# Patient Record
Sex: FEMALE | Race: WHITE | NOT HISPANIC OR LATINO | URBAN - NONMETROPOLITAN AREA
[De-identification: names, ages, dates, MRNs, and addresses within clinical notes are randomized per-mention and may not be internally consistent; named-entity substitution may affect disease eponyms.]

---

## 2017-01-25 ENCOUNTER — APPOINTMENT (RX ONLY)
Dept: URBAN - NONMETROPOLITAN AREA CLINIC 28 | Facility: CLINIC | Age: 22
Setting detail: DERMATOLOGY
End: 2017-01-25

## 2017-01-25 VITALS
DIASTOLIC BLOOD PRESSURE: 69 MMHG | HEART RATE: 67 BPM | SYSTOLIC BLOOD PRESSURE: 103 MMHG | HEIGHT: 65 IN | WEIGHT: 115 LBS

## 2017-01-25 DIAGNOSIS — L70.0 ACNE VULGARIS: ICD-10-CM

## 2017-01-25 DIAGNOSIS — L90.5 SCAR CONDITIONS AND FIBROSIS OF SKIN: ICD-10-CM

## 2017-01-25 DIAGNOSIS — Z79.899 OTHER LONG TERM (CURRENT) DRUG THERAPY: ICD-10-CM

## 2017-01-25 PROCEDURE — 81025 URINE PREGNANCY TEST: CPT

## 2017-01-25 PROCEDURE — ? PRESCRIPTION

## 2017-01-25 PROCEDURE — ? URINE PREGNANCY TEST

## 2017-01-25 PROCEDURE — ? ISOTRETINOIN INITIATION

## 2017-01-25 PROCEDURE — ? ORDER TESTS

## 2017-01-25 PROCEDURE — 99203 OFFICE O/P NEW LOW 30 MIN: CPT

## 2017-01-25 PROCEDURE — ? COUNSELING

## 2017-01-25 RX ORDER — DOXYCYCLINE HYCLATE 100 MG/1
CAPSULE, GELATIN COATED ORAL BID
Qty: 60 | Refills: 1 | Status: ERX | COMMUNITY
Start: 2017-01-25

## 2017-01-25 RX ADMIN — DOXYCYCLINE HYCLATE: 100 CAPSULE, GELATIN COATED ORAL at 20:06

## 2017-01-25 ASSESSMENT — LOCATION SIMPLE DESCRIPTION DERM
LOCATION SIMPLE: UPPER BACK
LOCATION SIMPLE: LEFT CHEEK

## 2017-01-25 ASSESSMENT — LOCATION ZONE DERM
LOCATION ZONE: TRUNK
LOCATION ZONE: FACE

## 2017-01-25 ASSESSMENT — LOCATION DETAILED DESCRIPTION DERM
LOCATION DETAILED: SUPERIOR THORACIC SPINE
LOCATION DETAILED: LEFT INFERIOR MEDIAL MALAR CHEEK
LOCATION DETAILED: LEFT INFERIOR CENTRAL MALAR CHEEK

## 2017-01-25 NOTE — PROCEDURE: URINE PREGNANCY TEST
Urine Pregnancy Test Result: negative
Detail Level: None
Expiration Date (Optional): 2018/04
Lot # (Optional): [id number]

## 2017-01-25 NOTE — HPI: PIMPLES (NODULAR ACNE)
How Severe Is Your Nodular Acne?: severe
Is This A New Presentation, Or A Follow-Up?: Acne
Females Only: When Was Your Last Menstrual Period?: 01/05/2017

## 2017-03-03 ENCOUNTER — APPOINTMENT (RX ONLY)
Dept: URBAN - NONMETROPOLITAN AREA CLINIC 28 | Facility: CLINIC | Age: 22
Setting detail: DERMATOLOGY
End: 2017-03-03

## 2017-03-03 DIAGNOSIS — L70.0 ACNE VULGARIS: ICD-10-CM | Status: STABLE

## 2017-03-03 DIAGNOSIS — L90.5 SCAR CONDITIONS AND FIBROSIS OF SKIN: ICD-10-CM

## 2017-03-03 PROCEDURE — ? ORDER TESTS

## 2017-03-03 PROCEDURE — 81025 URINE PREGNANCY TEST: CPT

## 2017-03-03 PROCEDURE — 99213 OFFICE O/P EST LOW 20 MIN: CPT

## 2017-03-03 PROCEDURE — ? ISOTRETINOIN INITIATION

## 2017-03-03 PROCEDURE — ? COUNSELING: ISOTRETINOIN

## 2017-03-03 PROCEDURE — ? URINE PREGNANCY TEST

## 2017-03-03 PROCEDURE — ? COUNSELING

## 2017-03-03 PROCEDURE — ? PRESCRIPTION

## 2017-03-03 RX ORDER — ISOTRETINOIN 40 MG/1
CAPSULE, LIQUID FILLED ORAL
Qty: 30 | Refills: 0 | Status: ERX | COMMUNITY
Start: 2017-03-03

## 2017-03-03 RX ADMIN — ISOTRETINOIN: 40 CAPSULE, LIQUID FILLED ORAL at 15:28

## 2017-03-03 ASSESSMENT — LOCATION DETAILED DESCRIPTION DERM
LOCATION DETAILED: RIGHT INFERIOR CENTRAL MALAR CHEEK
LOCATION DETAILED: LEFT INFERIOR CENTRAL MALAR CHEEK

## 2017-03-03 ASSESSMENT — LOCATION SIMPLE DESCRIPTION DERM
LOCATION SIMPLE: LEFT CHEEK
LOCATION SIMPLE: RIGHT CHEEK

## 2017-03-03 ASSESSMENT — LOCATION ZONE DERM: LOCATION ZONE: FACE

## 2017-03-03 NOTE — PROCEDURE: URINE PREGNANCY TEST
Urine Pregnancy Test Result: negative
Lot # (Optional): [id number]
Expiration Date (Optional): 2018/04
Detail Level: None

## 2017-04-03 ENCOUNTER — APPOINTMENT (RX ONLY)
Dept: URBAN - NONMETROPOLITAN AREA CLINIC 28 | Facility: CLINIC | Age: 22
Setting detail: DERMATOLOGY
End: 2017-04-03

## 2017-04-03 DIAGNOSIS — Z79.899 OTHER LONG TERM (CURRENT) DRUG THERAPY: ICD-10-CM

## 2017-04-03 DIAGNOSIS — L70.0 ACNE VULGARIS: ICD-10-CM

## 2017-04-03 DIAGNOSIS — L90.5 SCAR CONDITIONS AND FIBROSIS OF SKIN: ICD-10-CM

## 2017-04-03 PROCEDURE — ? ISOTRETINOIN MONITORING

## 2017-04-03 PROCEDURE — ? ORDER TESTS

## 2017-04-03 PROCEDURE — ? COUNSELING

## 2017-04-03 PROCEDURE — 99213 OFFICE O/P EST LOW 20 MIN: CPT | Mod: 25

## 2017-04-03 PROCEDURE — ? URINE PREGNANCY TEST

## 2017-04-03 PROCEDURE — ? PRESCRIPTION

## 2017-04-03 PROCEDURE — 36415 COLL VENOUS BLD VENIPUNCTURE: CPT

## 2017-04-03 PROCEDURE — 81025 URINE PREGNANCY TEST: CPT

## 2017-04-03 PROCEDURE — ? HIGH RISK MEDICATION MONITORING

## 2017-04-03 PROCEDURE — ? VENIPUNCTURE

## 2017-04-03 RX ORDER — ISOTRETINOIN 40 MG/1
CAPSULE, LIQUID FILLED ORAL
Qty: 30 | Refills: 0 | Status: ERX

## 2017-04-03 ASSESSMENT — LOCATION SIMPLE DESCRIPTION DERM
LOCATION SIMPLE: RIGHT UPPER ARM
LOCATION SIMPLE: RIGHT CHEEK
LOCATION SIMPLE: LEFT CHEEK

## 2017-04-03 ASSESSMENT — LOCATION ZONE DERM
LOCATION ZONE: FACE
LOCATION ZONE: ARM

## 2017-04-03 ASSESSMENT — LOCATION DETAILED DESCRIPTION DERM
LOCATION DETAILED: RIGHT SUPERIOR MEDIAL BUCCAL CHEEK
LOCATION DETAILED: RIGHT ANTECUBITAL SKIN
LOCATION DETAILED: LEFT INFERIOR CENTRAL MALAR CHEEK

## 2017-04-03 NOTE — PROCEDURE: ISOTRETINOIN MONITORING
Dosing Month 1 (Required For Cumulative Dosing): 40mg Daily
Display Individual Monthly Dosage In The Note (If Yes Will Display All Dosages Which Are Not N/A): yes
Detail Level: Zone
Weight Units: pounds
Ipledge Number (Optional): [id number]
Patient Weight (Optional But Required For Cumulative Dose-Numbers And Decimals Only): 110
Kilograms Preamble Statement (Weight Entered In Details Tab): Reported Weight in pounds:
Months Of Therapy Completed: 1

## 2017-05-03 ENCOUNTER — APPOINTMENT (RX ONLY)
Dept: URBAN - NONMETROPOLITAN AREA CLINIC 28 | Facility: CLINIC | Age: 22
Setting detail: DERMATOLOGY
End: 2017-05-03

## 2017-05-03 DIAGNOSIS — Z79.899 OTHER LONG TERM (CURRENT) DRUG THERAPY: ICD-10-CM

## 2017-05-03 DIAGNOSIS — L70.0 ACNE VULGARIS: ICD-10-CM | Status: IMPROVED

## 2017-05-03 PROCEDURE — ? ISOTRETINOIN MONITORING

## 2017-05-03 PROCEDURE — ? HIGH RISK MEDICATION MONITORING

## 2017-05-03 PROCEDURE — ? COUNSELING

## 2017-05-03 PROCEDURE — ? TREATMENT REGIMEN

## 2017-05-03 PROCEDURE — 81025 URINE PREGNANCY TEST: CPT

## 2017-05-03 PROCEDURE — 99213 OFFICE O/P EST LOW 20 MIN: CPT

## 2017-05-03 PROCEDURE — ? URINE PREGNANCY TEST

## 2017-05-03 PROCEDURE — ? PRESCRIPTION

## 2017-05-03 RX ORDER — ISOTRETINOIN 40 MG/1
CAPSULE, LIQUID FILLED ORAL
Qty: 30 | Refills: 0 | Status: ERX

## 2017-05-03 ASSESSMENT — LOCATION DETAILED DESCRIPTION DERM: LOCATION DETAILED: LEFT CENTRAL MALAR CHEEK

## 2017-05-03 ASSESSMENT — LOCATION SIMPLE DESCRIPTION DERM: LOCATION SIMPLE: LEFT CHEEK

## 2017-05-03 ASSESSMENT — LOCATION ZONE DERM: LOCATION ZONE: FACE

## 2017-05-03 NOTE — PROCEDURE: URINE PREGNANCY TEST
Expiration Date (Optional): 2018-07-31
Detail Level: None
Urine Pregnancy Test Result: negative
Lot # (Optional): [id number]

## 2017-05-03 NOTE — HPI: MEDICATION (ACCUTANE)
How Severe Is It?: moderate
Is This A New Presentation, Or A Follow-Up?: Follow Up Accutane
Display Cumulative Dose In The Note?: Yes
When Was Your Last Visit?: 3/3/17

## 2017-05-03 NOTE — PROCEDURE: ISOTRETINOIN MONITORING
Detail Level: Zone
Kilograms Preamble Statement (Weight Entered In Details Tab): Reported Weight in pounds:
Patient Weight (Optional But Required For Cumulative Dose-Numbers And Decimals Only): 110
Weight Units: pounds
Months Of Therapy Completed: 2
Display Individual Monthly Dosage In The Note (If Yes Will Display All Dosages Which Are Not N/A): yes
Ipledge Number (Optional): [id number]
Dosing Month 2 (Required For Cumulative Dosing): 40mg Daily

## 2017-06-02 ENCOUNTER — APPOINTMENT (RX ONLY)
Dept: URBAN - NONMETROPOLITAN AREA CLINIC 28 | Facility: CLINIC | Age: 22
Setting detail: DERMATOLOGY
End: 2017-06-02

## 2017-06-02 DIAGNOSIS — Z79.899 OTHER LONG TERM (CURRENT) DRUG THERAPY: ICD-10-CM

## 2017-06-02 DIAGNOSIS — L70.0 ACNE VULGARIS: ICD-10-CM | Status: IMPROVED

## 2017-06-02 PROCEDURE — ? COUNSELING

## 2017-06-02 PROCEDURE — ? HIGH RISK MEDICATION MONITORING

## 2017-06-02 PROCEDURE — ? ISOTRETINOIN MONITORING

## 2017-06-02 PROCEDURE — ? PRESCRIPTION

## 2017-06-02 PROCEDURE — ? TREATMENT REGIMEN

## 2017-06-02 PROCEDURE — 81025 URINE PREGNANCY TEST: CPT

## 2017-06-02 PROCEDURE — 99214 OFFICE O/P EST MOD 30 MIN: CPT

## 2017-06-02 PROCEDURE — ? URINE PREGNANCY TEST

## 2017-06-02 PROCEDURE — ? COUNSELING: ISOTRETINOIN

## 2017-06-02 RX ORDER — ISOTRETINOIN 40 MG/1
CAPSULE, LIQUID FILLED ORAL
Qty: 30 | Refills: 0 | Status: ERX

## 2017-06-02 ASSESSMENT — LOCATION ZONE DERM: LOCATION ZONE: FACE

## 2017-06-02 ASSESSMENT — LOCATION DETAILED DESCRIPTION DERM: LOCATION DETAILED: LEFT INFERIOR CENTRAL MALAR CHEEK

## 2017-06-02 ASSESSMENT — LOCATION SIMPLE DESCRIPTION DERM: LOCATION SIMPLE: LEFT CHEEK

## 2017-06-02 NOTE — HPI: MEDICATION (ISOTRETINOIN)
How Severe Is It?: severe
Is This A New Presentation, Or A Follow-Up?: Follow Up Isotretinoin
Additional History: The patient had a breakout on her right cheek that begun yesterday she says it is a little better this morning her period begins in \"three days\"
Females Only: When Was Your Last Menstrual Period?: 5/02/201

## 2017-06-02 NOTE — PROCEDURE: URINE PREGNANCY TEST
Expiration Date (Optional): 2018-08
Urine Pregnancy Test Result: negative
Lot # (Optional): [id number]
Detail Level: None

## 2017-06-02 NOTE — PROCEDURE: TREATMENT REGIMEN
Initiate Treatment: Claravis 40mg one pill daily
Detail Level: Zone
Samples Given: Fix my skin 1% hydrocortisone healing lip balm
Otc Regimen: Hydrocortisone to the lips and mouth area for severe dryness
Continue Regimen: Isotretinoin 40mg by mouth once daily

## 2017-06-02 NOTE — PROCEDURE: ISOTRETINOIN MONITORING
Ipledge Number (Optional): [id number]
Dosing Month 3 (Required For Cumulative Dosing): 40mg Daily
Months Of Therapy Completed: 3
Display Individual Monthly Dosage In The Note (If Yes Will Display All Dosages Which Are Not N/A): yes
Kilograms Preamble Statement (Weight Entered In Details Tab): Reported Weight in pounds:
Weight Units: pounds
Detail Level: Zone
Patient Weight (Optional But Required For Cumulative Dose-Numbers And Decimals Only): 120